# Patient Record
Sex: FEMALE | Race: WHITE | Employment: UNEMPLOYED | URBAN - METROPOLITAN AREA
[De-identification: names, ages, dates, MRNs, and addresses within clinical notes are randomized per-mention and may not be internally consistent; named-entity substitution may affect disease eponyms.]

---

## 2017-08-28 PROBLEM — I10 ESSENTIAL HYPERTENSION: Status: ACTIVE | Noted: 2017-08-28

## 2019-02-14 ENCOUNTER — HOSPITAL ENCOUNTER (OUTPATIENT)
Age: 59
Discharge: HOME | End: 2019-02-16
Payer: COMMERCIAL

## 2019-02-14 PROCEDURE — 88175 CYTOPATH C/V AUTO FLUID REDO: CPT

## 2019-03-25 ENCOUNTER — HOSPITAL ENCOUNTER (OUTPATIENT)
Age: 59
Discharge: HOME | End: 2019-03-27
Payer: COMMERCIAL

## 2019-03-25 PROCEDURE — 87186 SC STD MICRODIL/AGAR DIL: CPT

## 2019-03-25 PROCEDURE — 87088 URINE BACTERIA CULTURE: CPT

## 2019-03-28 LAB
ORGANISM: ABNORMAL
ORGANISM: ABNORMAL
URINE CULTURE, ROUTINE: ABNORMAL

## 2019-08-22 ENCOUNTER — HOSPITAL ENCOUNTER (OUTPATIENT)
Age: 59
Discharge: HOME | End: 2019-08-24
Payer: COMMERCIAL

## 2019-08-22 DIAGNOSIS — E34.9 HORMONE IMBALANCE: ICD-10-CM

## 2019-08-22 DIAGNOSIS — Z79.890 POSTMENOPAUSAL HRT (HORMONE REPLACEMENT THERAPY): ICD-10-CM

## 2019-08-22 LAB
ESTRADIOL LEVEL: 69.9 PG/ML
HBA1C MFR BLD: 4.9 % (ref 4–5.6)
T3 FREE: 2.7 PG/ML (ref 2–4.4)
T4 FREE: 1.24 NG/DL (ref 0.93–1.7)
TSH SERPL DL<=0.05 MIU/L-ACNC: 1.52 UIU/ML (ref 0.27–4.2)
VITAMIN D 25-HYDROXY: 47 NG/ML (ref 30–100)

## 2019-08-22 PROCEDURE — 84403 ASSAY OF TOTAL TESTOSTERONE: CPT

## 2019-08-22 PROCEDURE — 82306 VITAMIN D 25 HYDROXY: CPT

## 2019-08-22 PROCEDURE — 84144 ASSAY OF PROGESTERONE: CPT

## 2019-08-22 PROCEDURE — 84481 FREE ASSAY (FT-3): CPT

## 2019-08-22 PROCEDURE — 82627 DEHYDROEPIANDROSTERONE: CPT

## 2019-08-22 PROCEDURE — 83036 HEMOGLOBIN GLYCOSYLATED A1C: CPT

## 2019-08-22 PROCEDURE — 84439 ASSAY OF FREE THYROXINE: CPT

## 2019-08-22 PROCEDURE — 84443 ASSAY THYROID STIM HORMONE: CPT

## 2019-08-22 PROCEDURE — 82670 ASSAY OF TOTAL ESTRADIOL: CPT

## 2019-08-22 PROCEDURE — 84270 ASSAY OF SEX HORMONE GLOBUL: CPT

## 2019-08-24 LAB
DHEAS (DHEA SULFATE): 59 UG/DL (ref 26–200)
PROGESTERONE LEVEL: 1.16 NG/ML
SEX HORMONE BINDING GLOBULIN: 115 NMOL/L (ref 30–135)
TESTOSTERONE FREE-NONMALE: ABNORMAL PG/ML (ref 0.6–3.8)
TESTOSTERONE TOTAL: <3 NG/DL (ref 20–70)

## 2019-08-28 ENCOUNTER — APPOINTMENT (OUTPATIENT)
Dept: CT IMAGING | Age: 59
End: 2019-08-28
Payer: COMMERCIAL

## 2019-08-28 ENCOUNTER — APPOINTMENT (OUTPATIENT)
Dept: GENERAL RADIOLOGY | Age: 59
End: 2019-08-28
Payer: COMMERCIAL

## 2019-08-28 ENCOUNTER — HOSPITAL ENCOUNTER (OUTPATIENT)
Age: 59
Setting detail: OBSERVATION
Discharge: HOME | End: 2019-08-29
Attending: EMERGENCY MEDICINE | Admitting: INTERNAL MEDICINE
Payer: COMMERCIAL

## 2019-08-28 ENCOUNTER — APPOINTMENT (OUTPATIENT)
Dept: MRI IMAGING | Age: 59
End: 2019-08-28
Payer: COMMERCIAL

## 2019-08-28 DIAGNOSIS — R42 DIZZINESS: Primary | ICD-10-CM

## 2019-08-28 DIAGNOSIS — R00.1 BRADYCARDIA: ICD-10-CM

## 2019-08-28 PROBLEM — G45.9 TIA (TRANSIENT ISCHEMIC ATTACK): Status: ACTIVE | Noted: 2019-08-28

## 2019-08-28 LAB
ALBUMIN SERPL-MCNC: 4.3 G/DL (ref 3.5–5.2)
ALP BLD-CCNC: 54 U/L (ref 35–104)
ALT SERPL-CCNC: 16 U/L (ref 0–32)
ANION GAP SERPL CALCULATED.3IONS-SCNC: 7 MMOL/L (ref 7–16)
AST SERPL-CCNC: 18 U/L (ref 0–31)
BACTERIA: NORMAL /HPF
BASOPHILS ABSOLUTE: 0.05 E9/L (ref 0–0.2)
BASOPHILS RELATIVE PERCENT: 1 % (ref 0–2)
BILIRUB SERPL-MCNC: 0.5 MG/DL (ref 0–1.2)
BILIRUBIN URINE: NEGATIVE
BLOOD, URINE: NEGATIVE
BUN BLDV-MCNC: 9 MG/DL (ref 6–20)
CALCIUM SERPL-MCNC: 9.6 MG/DL (ref 8.6–10.2)
CHLORIDE BLD-SCNC: 100 MMOL/L (ref 98–107)
CLARITY: CLEAR
CO2: 32 MMOL/L (ref 22–29)
COLOR: YELLOW
CREAT SERPL-MCNC: 0.7 MG/DL (ref 0.5–1)
EKG ATRIAL RATE: 47 BPM
EKG P AXIS: 65 DEGREES
EKG P-R INTERVAL: 158 MS
EKG Q-T INTERVAL: 468 MS
EKG QRS DURATION: 88 MS
EKG QTC CALCULATION (BAZETT): 414 MS
EKG R AXIS: -36 DEGREES
EKG T AXIS: 56 DEGREES
EKG VENTRICULAR RATE: 47 BPM
EOSINOPHILS ABSOLUTE: 0.12 E9/L (ref 0.05–0.5)
EOSINOPHILS RELATIVE PERCENT: 2.3 % (ref 0–6)
GFR AFRICAN AMERICAN: >60
GFR AFRICAN AMERICAN: >60
GFR NON-AFRICAN AMERICAN: >60 ML/MIN/1.73
GFR NON-AFRICAN AMERICAN: >60 ML/MIN/1.73
GLUCOSE BLD-MCNC: 101 MG/DL (ref 74–99)
GLUCOSE BLD-MCNC: 101 MG/DL (ref 74–99)
GLUCOSE URINE: NEGATIVE MG/DL
HCT VFR BLD CALC: 42.3 % (ref 34–48)
HEMOGLOBIN: 13.9 G/DL (ref 11.5–15.5)
IMMATURE GRANULOCYTES #: 0.02 E9/L
IMMATURE GRANULOCYTES %: 0.4 % (ref 0–5)
KETONES, URINE: NEGATIVE MG/DL
LEUKOCYTE ESTERASE, URINE: ABNORMAL
LYMPHOCYTES ABSOLUTE: 1.44 E9/L (ref 1.5–4)
LYMPHOCYTES RELATIVE PERCENT: 27.9 % (ref 20–42)
MCH RBC QN AUTO: 31.5 PG (ref 26–35)
MCHC RBC AUTO-ENTMCNC: 32.9 % (ref 32–34.5)
MCV RBC AUTO: 95.9 FL (ref 80–99.9)
MONOCYTES ABSOLUTE: 0.58 E9/L (ref 0.1–0.95)
MONOCYTES RELATIVE PERCENT: 11.2 % (ref 2–12)
NEUTROPHILS ABSOLUTE: 2.96 E9/L (ref 1.8–7.3)
NEUTROPHILS RELATIVE PERCENT: 57.2 % (ref 43–80)
NITRITE, URINE: NEGATIVE
PDW BLD-RTO: 13 FL (ref 11.5–15)
PERFORMED ON: ABNORMAL
PH UA: 6.5 (ref 5–9)
PLATELET # BLD: 232 E9/L (ref 130–450)
PMV BLD AUTO: 11.6 FL (ref 7–12)
POC CHLORIDE: 103 MMOL/L (ref 100–108)
POC CREATININE: 0.6 MG/DL (ref 0.5–1)
POC POTASSIUM: 5.5 MMOL/L (ref 3.5–5)
POC SODIUM: 138 MMOL/L (ref 132–146)
POTASSIUM REFLEX MAGNESIUM: 4.1 MMOL/L (ref 3.5–5)
PROTEIN UA: NEGATIVE MG/DL
RBC # BLD: 4.41 E12/L (ref 3.5–5.5)
RBC UA: NORMAL /HPF (ref 0–2)
SODIUM BLD-SCNC: 139 MMOL/L (ref 132–146)
SPECIFIC GRAVITY UA: <=1.005 (ref 1–1.03)
TOTAL PROTEIN: 7.1 G/DL (ref 6.4–8.3)
TROPONIN: <0.01 NG/ML (ref 0–0.03)
TSH SERPL DL<=0.05 MIU/L-ACNC: 1.7 UIU/ML (ref 0.27–4.2)
UROBILINOGEN, URINE: 0.2 E.U./DL
WBC # BLD: 5.2 E9/L (ref 4.5–11.5)
WBC UA: NORMAL /HPF (ref 0–5)

## 2019-08-28 PROCEDURE — 80053 COMPREHEN METABOLIC PANEL: CPT

## 2019-08-28 PROCEDURE — 6360000004 HC RX CONTRAST MEDICATION: Performed by: RADIOLOGY

## 2019-08-28 PROCEDURE — 82565 ASSAY OF CREATININE: CPT

## 2019-08-28 PROCEDURE — 82435 ASSAY OF BLOOD CHLORIDE: CPT

## 2019-08-28 PROCEDURE — 93005 ELECTROCARDIOGRAM TRACING: CPT | Performed by: EMERGENCY MEDICINE

## 2019-08-28 PROCEDURE — 99285 EMERGENCY DEPT VISIT HI MDM: CPT

## 2019-08-28 PROCEDURE — 6370000000 HC RX 637 (ALT 250 FOR IP): Performed by: INTERNAL MEDICINE

## 2019-08-28 PROCEDURE — 81001 URINALYSIS AUTO W/SCOPE: CPT

## 2019-08-28 PROCEDURE — G0378 HOSPITAL OBSERVATION PER HR: HCPCS

## 2019-08-28 PROCEDURE — 99243 OFF/OP CNSLTJ NEW/EST LOW 30: CPT | Performed by: PSYCHIATRY & NEUROLOGY

## 2019-08-28 PROCEDURE — 93010 ELECTROCARDIOGRAM REPORT: CPT | Performed by: INTERNAL MEDICINE

## 2019-08-28 PROCEDURE — 70496 CT ANGIOGRAPHY HEAD: CPT

## 2019-08-28 PROCEDURE — A9579 GAD-BASE MR CONTRAST NOS,1ML: HCPCS | Performed by: RADIOLOGY

## 2019-08-28 PROCEDURE — 85025 COMPLETE CBC W/AUTO DIFF WBC: CPT

## 2019-08-28 PROCEDURE — 70553 MRI BRAIN STEM W/O & W/DYE: CPT

## 2019-08-28 PROCEDURE — 84132 ASSAY OF SERUM POTASSIUM: CPT

## 2019-08-28 PROCEDURE — 70498 CT ANGIOGRAPHY NECK: CPT

## 2019-08-28 PROCEDURE — 84443 ASSAY THYROID STIM HORMONE: CPT

## 2019-08-28 PROCEDURE — 6370000000 HC RX 637 (ALT 250 FOR IP): Performed by: EMERGENCY MEDICINE

## 2019-08-28 PROCEDURE — 71045 X-RAY EXAM CHEST 1 VIEW: CPT

## 2019-08-28 PROCEDURE — 70450 CT HEAD/BRAIN W/O DYE: CPT

## 2019-08-28 PROCEDURE — 84295 ASSAY OF SERUM SODIUM: CPT

## 2019-08-28 PROCEDURE — 36415 COLL VENOUS BLD VENIPUNCTURE: CPT

## 2019-08-28 PROCEDURE — 84484 ASSAY OF TROPONIN QUANT: CPT

## 2019-08-28 PROCEDURE — 82947 ASSAY GLUCOSE BLOOD QUANT: CPT

## 2019-08-28 PROCEDURE — 2580000003 HC RX 258: Performed by: INTERNAL MEDICINE

## 2019-08-28 RX ORDER — AMLODIPINE BESYLATE 5 MG/1
5 TABLET ORAL DAILY
Status: DISCONTINUED | OUTPATIENT
Start: 2019-08-29 | End: 2019-08-29 | Stop reason: HOSPADM

## 2019-08-28 RX ORDER — HYDROCHLOROTHIAZIDE 25 MG/1
25 TABLET ORAL DAILY
Status: DISCONTINUED | OUTPATIENT
Start: 2019-08-29 | End: 2019-08-29 | Stop reason: HOSPADM

## 2019-08-28 RX ORDER — ACETAMINOPHEN 325 MG/1
650 TABLET ORAL EVERY 4 HOURS PRN
Status: DISCONTINUED | OUTPATIENT
Start: 2019-08-28 | End: 2019-08-29 | Stop reason: HOSPADM

## 2019-08-28 RX ORDER — AMOXICILLIN 500 MG/1
500 CAPSULE ORAL 3 TIMES DAILY
Status: ON HOLD | COMMUNITY
Start: 2019-08-27 | End: 2019-08-29 | Stop reason: HOSPADM

## 2019-08-28 RX ORDER — SODIUM CHLORIDE 0.9 % (FLUSH) 0.9 %
10 SYRINGE (ML) INJECTION EVERY 12 HOURS SCHEDULED
Status: DISCONTINUED | OUTPATIENT
Start: 2019-08-28 | End: 2019-08-29 | Stop reason: HOSPADM

## 2019-08-28 RX ORDER — ESTRADIOL 0.05 MG/D
1 FILM, EXTENDED RELEASE TRANSDERMAL
COMMUNITY
End: 2020-02-27 | Stop reason: SDUPTHER

## 2019-08-28 RX ORDER — SODIUM CHLORIDE 0.9 % (FLUSH) 0.9 %
10 SYRINGE (ML) INJECTION
Status: ACTIVE | OUTPATIENT
Start: 2019-08-28 | End: 2019-08-28

## 2019-08-28 RX ORDER — M-VIT,TX,IRON,MINS/CALC/FOLIC 27MG-0.4MG
1 TABLET ORAL DAILY
Status: DISCONTINUED | OUTPATIENT
Start: 2019-08-29 | End: 2019-08-29 | Stop reason: HOSPADM

## 2019-08-28 RX ORDER — MECLIZINE HCL 12.5 MG/1
50 TABLET ORAL ONCE
Status: COMPLETED | OUTPATIENT
Start: 2019-08-28 | End: 2019-08-28

## 2019-08-28 RX ORDER — ONDANSETRON 2 MG/ML
4 INJECTION INTRAMUSCULAR; INTRAVENOUS EVERY 6 HOURS PRN
Status: DISCONTINUED | OUTPATIENT
Start: 2019-08-28 | End: 2019-08-29 | Stop reason: HOSPADM

## 2019-08-28 RX ORDER — SODIUM CHLORIDE 0.9 % (FLUSH) 0.9 %
10 SYRINGE (ML) INJECTION PRN
Status: DISCONTINUED | OUTPATIENT
Start: 2019-08-28 | End: 2019-08-29 | Stop reason: HOSPADM

## 2019-08-28 RX ORDER — ZOLPIDEM TARTRATE 5 MG/1
5 TABLET ORAL NIGHTLY PRN
Status: DISCONTINUED | OUTPATIENT
Start: 2019-08-28 | End: 2019-08-29 | Stop reason: HOSPADM

## 2019-08-28 RX ADMIN — ACETAMINOPHEN 650 MG: 325 TABLET, FILM COATED ORAL at 22:09

## 2019-08-28 RX ADMIN — MECLIZINE 50 MG: 12.5 TABLET ORAL at 12:03

## 2019-08-28 RX ADMIN — Medication 10 ML: at 16:56

## 2019-08-28 RX ADMIN — ZOLPIDEM TARTRATE 5 MG: 5 TABLET ORAL at 22:09

## 2019-08-28 RX ADMIN — GADOTERIDOL 10 ML: 279.3 INJECTION, SOLUTION INTRAVENOUS at 21:23

## 2019-08-28 RX ADMIN — IOPAMIDOL 60 ML: 755 INJECTION, SOLUTION INTRAVENOUS at 16:56

## 2019-08-28 RX ADMIN — Medication 10 ML: at 22:11

## 2019-08-28 ASSESSMENT — ENCOUNTER SYMPTOMS
NAUSEA: 0
EYE REDNESS: 0
SHORTNESS OF BREATH: 0
VOMITING: 0
ABDOMINAL PAIN: 0

## 2019-08-28 ASSESSMENT — PAIN SCALES - GENERAL
PAINLEVEL_OUTOF10: 0
PAINLEVEL_OUTOF10: 2
PAINLEVEL_OUTOF10: 0

## 2019-08-28 ASSESSMENT — PAIN DESCRIPTION - DESCRIPTORS: DESCRIPTORS: HEADACHE

## 2019-08-28 NOTE — CONSULTS
Tramaine Castillo is a 63 y.o. female       Chief Complaint   Patient presents with    Dizziness     Patient was pouring coffee and felt like \"the world tipped\" and dropped her coffee and  caught her, laid her down and patient states dizziness was worse when laying flat. HPI:  63year old woman with history of HTN presents with sudden onset dizziness. Occurred suddenly she felt as though the world was tilting initially. She denies jimmy vertigo. Never had any previous episodes in the past. She still feels intermittently dizzy especially when she sits up or moves quickly. No previous neurologic events in the past. No other associated symptoms. HPI  Prior to Visit Medications    Medication Sig Taking? Authorizing Provider   estradiol (VIVELLE) 0.05 MG/24HR Place 1 patch onto the skin Twice a Week Changes patch on Mondays and Thursdays Yes Historical Provider, MD   amoxicillin (AMOXIL) 500 MG capsule Take 500 mg by mouth 3 times daily After root canal procedure Yes Historical Provider, MD   progesterone (PROMETRIUM) 100 MG capsule TAKE 1 CAPSULE DAILY Yes Ingris Frias PA-C   amLODIPine (NORVASC) 5 MG tablet TAKE 1 TABLET DAILY (SCHEDULE APPOINTMENT WITH DOCTOR) Yes Andrea Butler MD   calcium carbonate (OSCAL) 500 MG TABS tablet Take 500 mg by mouth daily Yes Historical Provider, MD   hydrochlorothiazide (HYDRODIURIL) 25 MG tablet TAKE 1 TABLET DAILY Yes Maryann Patel MD   PREVIDENT 5000 SENSITIVE 1.1-5 % PSTE  Yes Historical Provider, MD   Multiple Vitamins-Minerals (THERAPEUTIC MULTIVITAMIN-MINERALS) tablet Take 1 tablet by mouth daily Yes Historical Provider, MD     Social History     Tobacco Use    Smoking status: Never Smoker    Smokeless tobacco: Never Used   Substance Use Topics    Alcohol use:  Yes     Alcohol/week: 7.0 standard drinks     Types: 7 Standard drinks or equivalent per week     Comment: daily glass of wine    Drug use: No     Family History   Problem Relation Age of Onset  Stroke Maternal Grandmother     Heart Disease Father     Heart Attack Mother     Stroke Mother     Hypertension Mother     Diabetes Brother      Past Surgical History:   Procedure Laterality Date    DILATION AND CURETTAGE       Past Medical History:   Diagnosis Date    Hypertension      Review of Systems   Neurological: Positive for dizziness. All other systems reviewed and are negative. Objective:   BP (!) 140/73   Pulse (!) 48   Temp 97.8 °F (36.6 °C) (Temporal)   Resp 16   Ht 5' (1.524 m)   Wt 110 lb (49.9 kg)   LMP  (LMP Unknown)   SpO2 96%   BMI 21.48 kg/m²     Physical Exam   Constitutional: She appears well-nourished. No distress. HENT:   Head: Normocephalic and atraumatic. Mouth/Throat: Oropharynx is clear and moist.   Eyes: Pupils are equal, round, and reactive to light. Conjunctivae are normal.   Neck: Neck supple. Cardiovascular: Normal rate and regular rhythm. Pulmonary/Chest: Effort normal.   Musculoskeletal: Normal range of motion. Neurological: She has normal strength and normal reflexes. Skin: Skin is warm and dry. Psychiatric: Her speech is normal.               Neurological Exam  Mental Status   Oriented to person, place, time and situation. Recent and remote memory are intact. Speech is normal. Language is fluent with no aphasia. Attention and concentration are normal. Fund of knowledge is appropriate for level of education. Cranial Nerves  CN II: Visual fields full to confrontation. CN III, IV, VI: Pupils equal round and reactive to light bilaterally. Right beating nystagmus worse when looking to the right. Impaired smooth pursuit worse when moving eyes to the left. .  CN V: Facial sensation is normal.  CN VII: Full and symmetric facial movement. CN VIII: Hearing is normal.  CN IX, X: Palate elevates symmetrically  CN XI: Shoulder shrug strength is normal.  CN XII: Tongue midline without atrophy or fasciculations.     Motor  Normal muscle bulk

## 2019-08-28 NOTE — PROGRESS NOTES
Spiketeddy Leyda was ordered progesterone which is a nonformulary medication. The patient has indicated that the home supply of this medication will be brought in to the hospital for inpatient use. If the medication has not been administered by 1400 on the following day from the time the order was placed, a pharmacist will follow-up with the nurse of the patient to assess the capability of the patient to bring in the medication. If it is determined that the patient cannot supply the medication and it is not available to be dispensed from the pharmacy, a call will be placed to the ordering provider to discuss alternative options.       Leana Ohara, PharmD 8/28/2019 3:10 PM

## 2019-08-28 NOTE — ED PROVIDER NOTES
4.200 uIU/mL   Urinalysis, reflex to microscopic   Result Value Ref Range    Color, UA Yellow Straw/Yellow    Clarity, UA Clear Clear    Glucose, Ur Negative Negative mg/dL    Bilirubin Urine Negative Negative    Ketones, Urine Negative Negative mg/dL    Specific Gravity, UA <=1.005 1.005 - 1.030    Blood, Urine Negative Negative    pH, UA 6.5 5.0 - 9.0    Protein, UA Negative Negative mg/dL    Urobilinogen, Urine 0.2 <2.0 E.U./dL    Nitrite, Urine Negative Negative    Leukocyte Esterase, Urine TRACE (A) Negative   Microscopic Urinalysis   Result Value Ref Range    WBC, UA 1-3 0 - 5 /HPF    RBC, UA NONE 0 - 2 /HPF    Bacteria, UA NONE /HPF   POCT Venous   Result Value Ref Range    POC Sodium 138 132 - 146 mmol/L    POC Potassium 5.5 (H) 3.5 - 5.0 mmol/L    POC Chloride 103 100 - 108 mmol/L    POC Glucose 101 (H) 74 - 99 mg/dl    POC Creatinine 0.6 0.5 - 1.0 mg/dL    GFR Non-African American >60 >=60 mL/min/1.73    GFR  >60     Performed on SEE BELOW    EKG 12 Lead   Result Value Ref Range    Ventricular Rate 47 BPM    Atrial Rate 47 BPM    P-R Interval 158 ms    QRS Duration 88 ms    Q-T Interval 468 ms    QTc Calculation (Bazett) 414 ms    P Axis 65 degrees    R Axis -36 degrees    T Axis 56 degrees       RADIOLOGY:  Interpreted by Radiologist.  CT Head WO Contrast   Final Result      1. No evidence of intracranial hemorrhage or edema. 2. No evidence of arterial stenosis at level of the [place]. 3. No evidence of stenosis involving proximal or distal cervical   internal carotid arteries or vertebral arteries. CTA HEAD W CONTRAST   Final Result      1. No evidence of intracranial hemorrhage or edema. 2. No evidence of arterial stenosis at level of the [place]. 3. No evidence of stenosis involving proximal or distal cervical   internal carotid arteries or vertebral arteries. CTA NECK W CONTRAST   Final Result      1.  No evidence of

## 2019-08-29 VITALS
DIASTOLIC BLOOD PRESSURE: 54 MMHG | HEIGHT: 60 IN | OXYGEN SATURATION: 98 % | SYSTOLIC BLOOD PRESSURE: 114 MMHG | TEMPERATURE: 98.7 F | HEART RATE: 50 BPM | BODY MASS INDEX: 21.6 KG/M2 | RESPIRATION RATE: 16 BRPM | WEIGHT: 110 LBS

## 2019-08-29 PROCEDURE — G0378 HOSPITAL OBSERVATION PER HR: HCPCS

## 2019-08-29 RX ORDER — MECLIZINE HCL 12.5 MG/1
12.5 TABLET ORAL 3 TIMES DAILY PRN
Qty: 15 TABLET | Refills: 0 | Status: SHIPPED | OUTPATIENT
Start: 2019-08-29 | End: 2019-09-08

## 2019-08-29 NOTE — PLAN OF CARE
Problem: HEMODYNAMIC STATUS  Goal: Patient has stable vital signs and fluid balance  Outcome: Met This Shift     Problem: ACTIVITY INTOLERANCE/IMPAIRED MOBILITY  Goal: Mobility/activity is maintained at optimum level for patient  Outcome: Met This Shift     Problem: COMMUNICATION IMPAIRMENT  Goal: Ability to express needs and understand communication  Outcome: Met This Shift

## 2019-08-29 NOTE — PROGRESS NOTES
CLINICAL PHARMACY NOTE: MEDS TO [address] [place] [address] Drive Select Patient?: No  Total # of Prescriptions Filled: 1   The following medications were delivered to the patient:  · Meclizine 12.5 mg  Total # of Interventions Completed: 3  Time Spent (min): 15    Additional Documentation:

## 2019-08-31 NOTE — DISCHARGE SUMMARY
PERRL, conjunctiva/corneas clear, EOM's intact, fundi     benign, both eyes   Ears:    Normal TM's and external ear canals, both ears   Nose:   Nares normal, septum midline, mucosa normal, no drainage    or sinus tenderness   Throat:   Lips, mucosa, and tongue normal; teeth and gums normal   Neck:   Supple, symmetrical, trachea midline, no adenopathy;     thyroid:  no enlargement/tenderness/nodules; no carotid    bruit or JVD   Back:     Symmetric, no curvature, ROM normal, no CVA tenderness   Lungs:     Clear to auscultation bilaterally, respirations unlabored   Chest Wall:    No tenderness or deformity    Heart:    Regular rate and rhythm, S1 and S2 normal, no murmur, rub   or gallop   Breast Exam:    No tenderness, masses, or nipple abnormality   Abdomen:     Soft, non-tender, bowel sounds active all four quadrants,     no masses, no organomegaly   Genitalia:    Normal female without lesion, discharge or tenderness   Rectal:    Normal tone ;guaiac negative stool   Extremities:   Extremities normal, atraumatic, no cyanosis or edema   Pulses:   2+ and symmetric all extremities   Skin:   Skin color, texture, turgor normal, no rashes or lesions   Lymph nodes:   Cervical, supraclavicular, and axillary nodes normal   Neurologic:   CNII-XII intact, normal strength, sensation and reflexes     throughout       Disposition: home    Patient Instructions:      Medication List      START taking these medications    meclizine 12.5 MG tablet  Commonly known as:  ANTIVERT  Take 1 tablet by mouth 3 times daily as needed for Dizziness        CONTINUE taking these medications    amLODIPine 5 MG tablet  Commonly known as:  NORVASC  TAKE 1 TABLET DAILY (SCHEDULE APPOINTMENT WITH DOCTOR)     calcium carbonate 500 MG Tabs tablet  Commonly known as:  OSCAL     estradiol 0.05 MG/24HR  Commonly known as:  MANNYELLE     hydrochlorothiazide 25 MG tablet  Commonly known as:  HYDRODIURIL  TAKE 1 TABLET DAILY     PREVIDENT 5000 SENSITIVE 1.1-5 % Pste  Generic drug:  Sod Fluoride-Potassium Nitrate     progesterone 100 MG capsule  Commonly known as:  PROMETRIUM  TAKE 1 CAPSULE DAILY     therapeutic multivitamin-minerals tablet        STOP taking these medications    amoxicillin 500 MG capsule  Commonly known as:  AMOXIL           Where to Get Your Medications      These medications were sent to [facility] [address] [address]    Phone:  [phone]   · meclizine 12.5 MG tablet        Activity: activity as tolerated  Diet: {diet: No diet orders on file  Wound Care: none needed    Follow-up with DR in 2 weeks.   Greater than 35 minutes spent on discharge preparations, examining patient, discussing with patient and coordinating with nurses and staff  Garcia exercises at home BID     Short course of PRN Antivert  Signed:  Ute Campbell  8/31/2019  4:57 AM

## 2019-09-09 PROBLEM — R42 VERTIGO: Status: ACTIVE | Noted: 2019-09-09

## 2019-09-12 ENCOUNTER — OFFICE VISIT (OUTPATIENT)
Dept: NEUROLOGY | Age: 59
End: 2019-09-12
Payer: COMMERCIAL

## 2019-09-12 VITALS
HEART RATE: 60 BPM | RESPIRATION RATE: 12 BRPM | BODY MASS INDEX: 21.17 KG/M2 | HEIGHT: 59 IN | WEIGHT: 105 LBS | TEMPERATURE: 98.2 F | DIASTOLIC BLOOD PRESSURE: 76 MMHG | SYSTOLIC BLOOD PRESSURE: 138 MMHG

## 2019-09-12 DIAGNOSIS — R90.82 WHITE MATTER ABNORMALITY ON MRI OF BRAIN: ICD-10-CM

## 2019-09-12 DIAGNOSIS — R42 VERTIGO: Primary | ICD-10-CM

## 2019-09-12 PROBLEM — H81.10 BPV (BENIGN POSITIONAL VERTIGO): Status: ACTIVE | Noted: 2019-08-28

## 2019-09-12 PROCEDURE — 1036F TOBACCO NON-USER: CPT | Performed by: NURSE PRACTITIONER

## 2019-09-12 PROCEDURE — 3017F COLORECTAL CA SCREEN DOC REV: CPT | Performed by: NURSE PRACTITIONER

## 2019-09-12 PROCEDURE — 99214 OFFICE O/P EST MOD 30 MIN: CPT | Performed by: NURSE PRACTITIONER

## 2019-09-12 PROCEDURE — G8427 DOCREV CUR MEDS BY ELIG CLIN: HCPCS | Performed by: NURSE PRACTITIONER

## 2019-09-12 PROCEDURE — G8420 CALC BMI NORM PARAMETERS: HCPCS | Performed by: NURSE PRACTITIONER

## 2019-09-12 SDOH — HEALTH STABILITY: MENTAL HEALTH: HOW OFTEN DO YOU HAVE A DRINK CONTAINING ALCOHOL?: 4 OR MORE TIMES A WEEK

## 2019-09-12 NOTE — PROGRESS NOTES
[address] [facility]. Noel Vora M.D., F.A.C.P. Patrica Méndez, HENRI, APRN, CNS  Dale Headley. Lane Lima, MSN, APRN-FNP-C  Odell Hayes MSN, APRN, FNP-C  JADA Victoria Cancer MSN, APRN, FNP-C  [address], [facility]  [place], [address] [address]  Phone: [phone]  Fax: [phone]       Edin Anguiano is a 63 y.o. right handed female     We are seeing her as a hosptial follow up for BPPV    She presents alone and is an excellent historian    PMH: HTN    She was seen by Dr. Kim Nunez last month after the sudden onset of dizziness described as the world tilting. Epley maneuver was done which proved nystagmus and recurrence of symptoms with L head turning. MRI of the brain proved a moderate amount of scattered white matter changes---mostly juxtacortical with a few periventricular lesions. She was discharged on a short course of Antivert, which she has only used once or twice as it makes her tired. Her symptoms are not resolved, but have improved. She has not been doing her head exercises at home. When she sits in a certain position, like leaning back in the dentist chair earlier today, her vertigo returns. No nausea or vomiting. No spinning sensations with head movements. No new weakness, clumsiness, gait issues, diplopia, paresthesias, or falls. No other new medical issues.     No chest pain or palpitations  No SOB  No falls, tripping or stumbling  No incontinence of bowels or bladder  No itching or bruising appreciated  No numbness, tingling or focal arm/leg weakness    ROS otherwise negative     Current Outpatient Medications   Medication Sig Dispense Refill    MECLIZINE HCL PO Take by mouth as needed      estradiol (VIVELLE) 0.05 MG/24HR Place 1 patch onto the skin Twice a Week Changes patch on Mondays and Thursdays      progesterone (PROMETRIUM) 100 MG capsule TAKE 1 CAPSULE DAILY 90 capsule 2    amLODIPine (NORVASC) 5 MG tablet TAKE 1 TABLET DAILY (SCHEDULE APPOINTMENT WITH DOCTOR) 90 tablet 1    calcium carbonate (OSCAL) 500 MG TABS tablet Take 500 mg by mouth daily      hydrochlorothiazide (HYDRODIURIL) 25 MG tablet TAKE 1 TABLET DAILY 90 tablet 3    PREVIDENT 5000 SENSITIVE 1.1-5 % PSTE       Multiple Vitamins-Minerals (THERAPEUTIC MULTIVITAMIN-MINERALS) tablet Take 1 tablet by mouth daily       No current facility-administered medications for this visit. Objective:     /76 (Site: Right Upper Arm, Position: Sitting, Cuff Size: Medium Adult)   Pulse 60   Temp 98.2 °F (36.8 °C) (Oral)   Resp 12   Ht 4' 11\" (1.499 m)   Wt 105 lb (47.6 kg)   LMP  (LMP Unknown)   BMI 21.21 kg/m²      General appearance: alert, appears stated age and cooperative--appears tired  Head: Normocephalic, without obvious abnormality, atraumatic  Eyes: conjunctivae/corneas clear.    Neck: no adenopathy, no carotid bruit, no JVD, supple, symmetrical, trachea midline and thyroid not enlarged, symmetric, no tenderness/mass/nodules  Lungs: clear to auscultation bilaterally  Heart: regular rate and rhythm, S1, S2 normal, no murmur, click, rub or gallop  Extremities: extremities normal, atraumatic, no cyanosis or edema  Pulses: 2+ and symmetric  Skin: Skin color, texture, turgor normal. No rashes or lesions       Mental Status: Alert, oriented, thought content appropriate    Appropriate attention/concentration  Intact fundus of knowledge  Intact memories    Speech: no dysarthria  Language: no aphasias    Cranial Nerves:  I: smell NA   II: visual acuity  NA   II: visual fields Full to confrontation   II: pupils HAYDER   III,VII: ptosis None   III,IV,VI: extraocular muscles  Full ROM   V: mastication Normal   V: facial light touch sensation  Normal   V,VII: corneal reflex     VII: facial muscle function - upper  Normal   VII: facial muscle function - lower Normal   VIII: hearing Normal   IX: soft palate elevation  Normal   IX,X: gag reflex    XI: trapezius strength  5/5   XI:

## 2019-09-12 NOTE — PATIENT INSTRUCTIONS
Patient Education        Benign Paroxysmal Positional Vertigo (BPPV): Care Instructions  Your Care Instructions    Benign paroxysmal positional vertigo, also called BPPV, is an inner ear problem. It causes a spinning or whirling sensation when you move your head. This sensation is called vertigo. The vertigo usually lasts for less than a minute. People often have vertigo spells for a few days or weeks. Then the vertigo goes away. But it may come back again. The vertigo may be mild, or it may be bad enough to cause unsteadiness, nausea, and vomiting. When you move, your inner ear sends messages to the brain. This helps you keep your balance. Vertigo can happen when debris builds up in the inner ear. The buildup can cause the inner ear to send the wrong message to the brain. Your doctor may move you in different positions to help your vertigo get better faster. This is called the Epley maneuver. Your doctor may also prescribe medicines or exercises to help with your symptoms. Follow-up care is a key part of your treatment and safety. Be sure to make and go to all appointments, and call your doctor if you are having problems. It's also a good idea to know your test results and keep a list of the medicines you take. How can you care for yourself at home? · If your doctor suggests that you do [place]am[place]-Daroff exercises:  ? Sit on the edge of a bed or sofa. Quickly lie down on the side that causes the worst vertigo. Lie on your side with your ear down. ? Stay in this position for at least 30 seconds or until the vertigo goes away. ? Sit up. If this causes vertigo, wait for it to stop. ? Repeat the procedure on the other side. ? Repeat this 10 times. Do these exercises 2 times a day until the vertigo is gone. When should you call for help? Call 911 anytime you think you may need emergency care. For example, call if:    · You have symptoms of a stroke.  These may include:  ? Sudden numbness, tingling, weakness, or loss of movement in your face, arm, or leg, especially on only one side of your body. ? Sudden vision changes. ? Sudden trouble speaking. ? Sudden confusion or trouble understanding simple statements. ? Sudden problems with walking or balance. ? A sudden, severe headache that is different from past headaches.    Call your doctor now or seek immediate medical care if:    · You have new or worse nausea and vomiting.     · You have new symptoms such as hearing loss or roaring in your ears.    Watch closely for changes in your health, and be sure to contact your doctor if:    · You are not getting better as expected.     · Your vertigo gets worse. Where can you learn more? Go to [email or web address]://[email or web address].[employer]. org and sign in to your [facility] account. Enter  in the [employer] box to learn more about \"Benign Paroxysmal Positional Vertigo (BPPV): Care Instructions. \"     If you do not have an account, please click on the \"Sign Up Now\" link. Current as of: October 21, 2018  Content Version: 12.1  © 3293-4408 Healthwise, Incorporated. Care instructions adapted under license by [facility] ([facility]). If you have questions about a medical condition or this instruction, always ask your healthcare professional. [place] [address] any warranty or liability for your use of this information.

## 2019-09-13 PROBLEM — R90.82 WHITE MATTER ABNORMALITY ON MRI OF BRAIN: Status: ACTIVE | Noted: 2019-09-13

## 2020-02-21 ENCOUNTER — TELEPHONE (OUTPATIENT)
Dept: NEUROLOGY | Age: 60
End: 2020-02-21

## 2020-02-21 NOTE — TELEPHONE ENCOUNTER
JERI CAMERON for pt to call office and schedule follow up with MEG Matthews, as schedule is now available.   Electronically signed by Gloria Mane on 2/21/20 at 4:19 PM

## 2020-03-02 ENCOUNTER — HOSPITAL ENCOUNTER (OUTPATIENT)
Age: 60
Discharge: HOME | End: 2020-03-04
Payer: COMMERCIAL

## 2020-03-02 PROCEDURE — 88175 CYTOPATH C/V AUTO FLUID REDO: CPT

## 2020-03-04 ENCOUNTER — TELEPHONE (OUTPATIENT)
Dept: NEUROLOGY | Age: 60
End: 2020-03-04

## 2020-03-04 NOTE — TELEPHONE ENCOUNTER
JERI CAMERON for pt to call office and schedule follow up with MEG Hampton, as schedule is now available.   Electronically signed by Sri Hughes on 3/4/20 at 4:04 PM

## 2020-03-20 ENCOUNTER — TELEPHONE (OUTPATIENT)
Dept: NEUROLOGY | Age: 60
End: 2020-03-20

## 2020-03-20 NOTE — LETTER
[facility]  [address] [facility], [address] [place] [address] Drive  [address] [place] [address]  Phone: [phone]  Fax: [phone]    MEG Khan - CNP        March 20, 2020    [place] [address]      Dear Isabella Baron: We have been unable to reach you to schedule your follow up appointment with our office. Please contact us as soon as possible to avoid any potential delays in your care.     Sincerely,      [facility] ([facility]) Neurology Staff

## 2020-03-25 ENCOUNTER — TELEPHONE (OUTPATIENT)
Dept: ADMINISTRATIVE | Age: 60
End: 2020-03-25

## 2020-03-25 NOTE — TELEPHONE ENCOUNTER
Pt's  called, appreciated the call to reach out, she is perfect, asymptomatic, no need to schedule a follow up. Thank you.

## 2020-03-25 NOTE — TELEPHONE ENCOUNTER
Forwarding to MEG Olguin, for informational purposes.   Electronically signed by Mirza Mcbride on 3/25/20 at 1:32 PM EDT

## 2020-12-09 DIAGNOSIS — N95.0 POST-MENOPAUSAL BLEEDING: ICD-10-CM

## 2020-12-09 LAB
ESTRADIOL LEVEL: 101.9 PG/ML
HCT VFR BLD CALC: 45.3 % (ref 34–48)
HEMOGLOBIN: 15.2 G/DL (ref 11.5–15.5)
MCH RBC QN AUTO: 31.7 PG (ref 26–35)
MCHC RBC AUTO-ENTMCNC: 33.6 % (ref 32–34.5)
MCV RBC AUTO: 94.6 FL (ref 80–99.9)
PDW BLD-RTO: 12.4 FL (ref 11.5–15)
PLATELET # BLD: 239 E9/L (ref 130–450)
PMV BLD AUTO: 11.8 FL (ref 7–12)
PROLACTIN: 8.82 NG/ML
RBC # BLD: 4.79 E12/L (ref 3.5–5.5)
TSH SERPL DL<=0.05 MIU/L-ACNC: 1.86 UIU/ML (ref 0.27–4.2)
WBC # BLD: 6.2 E9/L (ref 4.5–11.5)

## 2020-12-10 LAB — T4 FREE: 1.16 NG/DL (ref 0.93–1.7)

## 2020-12-11 LAB
PROGESTERONE LEVEL: 0.92 NG/ML
T3 FREE: 2.6 PG/ML (ref 2–4.4)

## 2021-01-10 DIAGNOSIS — Z20.822 CONTACT WITH AND (SUSPECTED) EXPOSURE TO COVID-19: Primary | ICD-10-CM

## 2021-02-09 ENCOUNTER — HOSPITAL ENCOUNTER (OUTPATIENT)
Age: 61
Discharge: HOME | End: 2021-02-11

## 2021-02-09 PROCEDURE — 88305 TISSUE EXAM BY PATHOLOGIST: CPT

## 2021-04-08 DIAGNOSIS — E55.9 VITAMIN D DEFICIENCY: ICD-10-CM

## 2021-04-08 DIAGNOSIS — Z79.890 POSTMENOPAUSAL HRT (HORMONE REPLACEMENT THERAPY): ICD-10-CM

## 2021-04-08 DIAGNOSIS — E34.9 HORMONE IMBALANCE: ICD-10-CM

## 2021-04-09 LAB
CORTISOL TOTAL: 10.34 MCG/DL (ref 2.68–18.4)
ESTRADIOL LEVEL: 50.8 PG/ML
T3 FREE: 2.7 PG/ML (ref 2–4.4)
T4 FREE: 1.19 NG/DL (ref 0.93–1.7)
TSH SERPL DL<=0.05 MIU/L-ACNC: 1.47 UIU/ML (ref 0.27–4.2)
VITAMIN D 25-HYDROXY: 51 NG/ML (ref 30–100)

## 2021-04-10 LAB
DHEAS (DHEA SULFATE): 75 UG/DL (ref 26–200)
PROGESTERONE LEVEL: 2.28 NG/ML
SEX HORMONE BINDING GLOBULIN: 88 NMOL/L (ref 30–135)
TESTOSTERONE FREE-NONMALE: ABNORMAL PG/ML (ref 0.6–3.8)
TESTOSTERONE TOTAL: <3 NG/DL (ref 20–70)

## 2021-04-14 LAB — PREGNENOLONE: NORMAL NG/DL (ref 15–132)

## 2021-12-07 ENCOUNTER — TELEPHONE (OUTPATIENT)
Dept: SURGERY | Age: 61
End: 2021-12-07

## 2021-12-07 NOTE — TELEPHONE ENCOUNTER
Scheduled pt for Colonoscopy on 1/5/21 (Wednesday) at 11:00 am with Dr. Bonnie Malik. Pt needs to arrive at [address] at 10:00 am. Brian Witt pt directions and instruction sheet via mail Pt accepted date/time and verbalized understanding. Pt needs prep sent to pharmacy (updated in chart)   Pt does not want Golytely prep. MA routing to Dr. Bonnie Malik for prep.     Electronically signed by Blaire Dean on 12/7/21 at 10:28 AM EST

## 2021-12-08 ENCOUNTER — IMMUNIZATION (OUTPATIENT)
Dept: PRIMARY CARE CLINIC | Age: 61
End: 2021-12-08
Payer: COMMERCIAL

## 2021-12-08 PROCEDURE — 0064A COVID-19, MODERNA BOOSTER VACCINE 0.25ML DOSE: CPT | Performed by: INTERNAL MEDICINE

## 2021-12-08 PROCEDURE — 91306 COVID-19, MODERNA BOOSTER VACCINE 0.25ML DOSE: CPT | Performed by: INTERNAL MEDICINE

## 2021-12-08 RX ORDER — SODIUM, POTASSIUM,MAG SULFATES 17.5-3.13G
1 SOLUTION, RECONSTITUTED, ORAL ORAL ONCE
Qty: 1 EACH | Refills: 0 | Status: SHIPPED | OUTPATIENT
Start: 2021-12-08 | End: 2021-12-08

## 2021-12-08 NOTE — TELEPHONE ENCOUNTER
Orders Placed This Encounter   Medications    Na Sulfate-K Sulfate-Mg Sulf (SUPREP BOWEL PREP KIT) 17.5-3.13-1.6 GM/177ML SOLN     Sig: Take 1 kit by mouth once for 1 dose     Dispense:  1 each     Refill:  0

## 2021-12-21 ENCOUNTER — TELEPHONE (OUTPATIENT)
Dept: SURGERY | Age: 61
End: 2021-12-21

## 2021-12-21 NOTE — TELEPHONE ENCOUNTER
Prior Authorization Form:      DEMOGRAPHICS:                     Patient Name:  Salvatore Coffey  Patient :  1960            Insurance:  Payor: [employer] / Plan: [employer] [address] / Product Type: *No Product type* /   Insurance ID Number:    Payor/Plan Subscr  Sex Relation Sub. Ins. ID Effective Group Num   1.  1041 Edmar Carroll 1960 Male Spouse [id number] 1/1/15 [id number]                                   [address]         DIAGNOSIS & PROCEDURE:                       Procedure/Operation: COLONOSCOPY           CPT Code: 22702    Diagnosis:  SCREENING FOR COLON CANCER    ICD10 Code: Z12.11    Location:  [facility]    Surgeon:  DR. Jan Barriag    SCHEDULING INFORMATION:                          Date: 22    Time: 11:00 AM              Anesthesia: [facility]                                                       Status:  Outpatient        Special Comments:  N/A       Electronically signed by Jennifer Mack on 2021 at 3:14 PM

## 2021-12-28 NOTE — PROGRESS NOTES
Patient states she has not received the bowel prep instructions for colonoscopy on 1/5/22. Advised for patient to call Dr. Haley Engle office. Spoke with Kizzy Horn at Dr. Haley Engle office regarding above.

## 2021-12-28 NOTE — PROGRESS NOTES
[address] PRE-ADMISSION TESTING ENDOSCOPY INSTRUCTIONS- [facility]-phone number:[phone]    ENDOSCOPY INSTRUCTIONS:   [x] Bowel prep instructions reviewed. [x] Nothing by mouth after midnight, including gum, candy, mints, or water. Please follow your surgeons instructions if you are required to complete a bowel prep. Colonoscopy- no solid food-only clear liquids the day prior). [x] You may brush your teeth, gargle, but do NOT swallow water. [x] Do not wear makeup, lotions, powders, deodorant. Nail polish as directed by the nurse. [x] Arrange transportation with a responsible adult  to and from the hospital. If you do not have a responsible adult  to transport you, you will need to make arrangements with a medical transportation company (i.e. [employer]. A Uber/taxi/bus is not appropriate unless you are accompanied by a responsible adult ). Arrange for someone to be with you for the remainder of the day and for 24 hours after your procedure due to having had anesthesia. Who will be your  for transportation?_____Mark_______   Who will be staying with you for 24 hrs after your procedure?__________________    PARKING INSTRUCTIONS:   [x] Arrival Time:___1000_________  · [x] Parking lot  \"I\" OR 1 is located on [facility] (the corner of [facility]). To enter, press the button and the gate will lift. A free token will be provided to exit the lot. One car per patient is allowed to park in this lot. All other cars are to park on [address] [place] [address] either in the parking garage or the handicap lot. [] To reach the [facility] lobby from [address] [place] [address], upon entering the hospital, take elevator B to the 3rd floor. EDUCATION INSTRUCTIONS:  [x] Bring a complete list of your medications, please write the last time you took the medicine, give this list to the nurse.   [x] Take the following medications the morning of surgery with 1-2 ounces of water:  Amlodipine   [x] Stop herbal supplements and vitamins 5 days before your surgery. [] DO NOT take any diabetic medicine the morning of surgery. Follow instructions for insulin the day before surgery. [] If you are diabetic and your blood sugar is low or you feel symptomatic, you may drink 1-2 ounces of apple juice or take a glucose tablet. The morning of your procedure, you may call the pre-op area if you have concerns about your blood sugar [phone]. [x] Use your inhalers the morning of surgery. Bring your emergency inhaler with you day of surgery. [x] Follow physician instructions regarding any blood thinners you may be taking. WHAT TO EXPECT:  [x] The day of your procedure you will be greeted and checked in by the [employer].  In addition, you will be registered in the [employer] by a Patient Access Representative. Please bring your photo ID and insurance card. A nurse will greet you in accordance to the time you are needed in the pre-op area to prepare you for surgery. Please do not be discouraged if you are not greeted in the order you arrive as there are many variables that are involved in patient preparation. Your patience is greatly appreciated as you wait for your nurse. Please bring in items such as: books, magazines, newspapers, electronics, or any other items  to occupy your time in the waiting area. [x]  Delays may occur. Staff will make a sincere effort to keep you informed of delays. If any delays occur with your procedure, we apologize ahead of time for your inconvenience as we recognize the value of your time.

## 2022-01-05 ENCOUNTER — ANESTHESIA (OUTPATIENT)
Dept: ENDOSCOPY | Age: 62
End: 2022-01-05
Payer: COMMERCIAL

## 2022-01-05 ENCOUNTER — ANESTHESIA EVENT (OUTPATIENT)
Dept: ENDOSCOPY | Age: 62
End: 2022-01-05
Payer: COMMERCIAL

## 2022-01-05 ENCOUNTER — HOSPITAL ENCOUNTER (OUTPATIENT)
Age: 62
Setting detail: OUTPATIENT SURGERY
Discharge: HOME | End: 2022-01-05
Attending: SURGERY | Admitting: SURGERY
Payer: COMMERCIAL

## 2022-01-05 VITALS
TEMPERATURE: 97.1 F | BODY MASS INDEX: 23.18 KG/M2 | WEIGHT: 115 LBS | DIASTOLIC BLOOD PRESSURE: 54 MMHG | OXYGEN SATURATION: 98 % | RESPIRATION RATE: 17 BRPM | HEART RATE: 60 BPM | SYSTOLIC BLOOD PRESSURE: 121 MMHG | HEIGHT: 59 IN

## 2022-01-05 VITALS — DIASTOLIC BLOOD PRESSURE: 70 MMHG | OXYGEN SATURATION: 99 % | SYSTOLIC BLOOD PRESSURE: 131 MMHG

## 2022-01-05 PROBLEM — Z12.11 COLON CANCER SCREENING: Status: ACTIVE | Noted: 2022-01-05

## 2022-01-05 PROCEDURE — 45378 DIAGNOSTIC COLONOSCOPY: CPT | Performed by: SURGERY

## 2022-01-05 PROCEDURE — 3700000000 HC ANESTHESIA ATTENDED CARE: Performed by: SURGERY

## 2022-01-05 PROCEDURE — 7100000011 HC PHASE II RECOVERY - ADDTL 15 MIN: Performed by: SURGERY

## 2022-01-05 PROCEDURE — 6360000002 HC RX W HCPCS: Performed by: NURSE ANESTHETIST, CERTIFIED REGISTERED

## 2022-01-05 PROCEDURE — 2580000003 HC RX 258: Performed by: SURGERY

## 2022-01-05 PROCEDURE — 3700000001 HC ADD 15 MINUTES (ANESTHESIA): Performed by: SURGERY

## 2022-01-05 PROCEDURE — 2709999900 HC NON-CHARGEABLE SUPPLY: Performed by: SURGERY

## 2022-01-05 PROCEDURE — 3609027000 HC COLONOSCOPY: Performed by: SURGERY

## 2022-01-05 PROCEDURE — 7100000010 HC PHASE II RECOVERY - FIRST 15 MIN: Performed by: SURGERY

## 2022-01-05 RX ORDER — PROPOFOL 10 MG/ML
INJECTION, EMULSION INTRAVENOUS PRN
Status: DISCONTINUED | OUTPATIENT
Start: 2022-01-05 | End: 2022-01-05 | Stop reason: SDUPTHER

## 2022-01-05 RX ORDER — SODIUM CHLORIDE 9 MG/ML
25 INJECTION, SOLUTION INTRAVENOUS PRN
Status: DISCONTINUED | OUTPATIENT
Start: 2022-01-05 | End: 2022-01-05 | Stop reason: HOSPADM

## 2022-01-05 RX ORDER — SODIUM CHLORIDE 0.9 % (FLUSH) 0.9 %
10 SYRINGE (ML) INJECTION PRN
Status: DISCONTINUED | OUTPATIENT
Start: 2022-01-05 | End: 2022-01-05 | Stop reason: HOSPADM

## 2022-01-05 RX ORDER — SODIUM CHLORIDE 0.9 % (FLUSH) 0.9 %
10 SYRINGE (ML) INJECTION EVERY 12 HOURS SCHEDULED
Status: DISCONTINUED | OUTPATIENT
Start: 2022-01-05 | End: 2022-01-05 | Stop reason: HOSPADM

## 2022-01-05 RX ORDER — SODIUM CHLORIDE 9 MG/ML
INJECTION, SOLUTION INTRAVENOUS CONTINUOUS
Status: DISCONTINUED | OUTPATIENT
Start: 2022-01-05 | End: 2022-01-05 | Stop reason: HOSPADM

## 2022-01-05 RX ADMIN — SODIUM CHLORIDE: 9 INJECTION, SOLUTION INTRAVENOUS at 11:09

## 2022-01-05 RX ADMIN — SODIUM CHLORIDE: 9 INJECTION, SOLUTION INTRAVENOUS at 09:54

## 2022-01-05 RX ADMIN — PROPOFOL 340 MG: 10 INJECTION, EMULSION INTRAVENOUS at 11:25

## 2022-01-05 RX ADMIN — SODIUM CHLORIDE: 9 INJECTION, SOLUTION INTRAVENOUS at 10:48

## 2022-01-05 ASSESSMENT — PAIN SCALES - GENERAL
PAINLEVEL_OUTOF10: 0

## 2022-01-05 ASSESSMENT — PAIN - FUNCTIONAL ASSESSMENT: PAIN_FUNCTIONAL_ASSESSMENT: 0-10

## 2022-01-05 ASSESSMENT — LIFESTYLE VARIABLES: SMOKING_STATUS: 0

## 2022-01-05 NOTE — PROGRESS NOTES
Patient admitted to [facility], placed on all appropriate monitors, all siderails up, cart locked,bed in low position.

## 2022-01-05 NOTE — ANESTHESIA POSTPROCEDURE EVALUATION
Department of Anesthesiology  Postprocedure Note    Patient: Winton Call  MRN: [id number]  YOB: 1960  Date of evaluation: 1/5/2022  Time:  11:52 AM     Procedure Summary     Date: 01/05/22 Room / Location: [address] [place] [address] / [facility]    Anesthesia Start: 2935 Anesthesia Stop: 6588    Procedure: COLORECTAL CANCER SCREENING, NOT HIGH RISK (N/A ) Diagnosis: (SCREENING)    Surgeons: Blair Murray MD Responsible Provider: Amrita Navarro MD    Anesthesia Type: MAC ASA Status: 2          Anesthesia Type: MAC    Perla Phase I: Perla Score: 10    Perla Phase II:      Last vitals: Reviewed and per EMR flowsheets.        Anesthesia Post Evaluation    Patient location during evaluation: PACU  Patient participation: complete - patient participated  Level of consciousness: awake  Airway patency: patent  Nausea & Vomiting: no nausea and no vomiting  Complications: no  Cardiovascular status: hemodynamically stable  Respiratory status: acceptable  Hydration status: euvolemic

## 2022-01-05 NOTE — OP NOTE
[address] [place] [address]  COLONOSCOPY REPORT    DATE OF PROCEDURE: 1/5/2022    SURGEON: Romelia May MD, FACS    ASSISTANT: None    INDICATION:  Average risk for colorectal cancer    PROCEDURE:  Colonoscopy     ANESTHESIA: MAC     EBL: None    COMPLICATIONS: none    SPECIMENS: none obtained    PROCEDURE: After identification of the patient and confirmation of the procedure, the patient was brought to the endoscopy suite and placed in the left lateral decubitus position. The patient was administered deep conscious sedation by the anesthesia staff. A digital rectal exam was performed, which was unremarkable. A lubricated adult colonoscope was inserted through the anus into the rectum and advanced into the cecum. The bowel prep was excellent. The terminal ileum was not intubated. The scope was then slowly withdrawn all the way back into the rectum and retroflexed. No biopsies were obtained. The scope was straightened. The colon was decompressed. The scope was removed.       FINDINGS:  Normal exam    ASSESSMENT:  Average risk for colorectal cancer    RECOMMENDATIONS:   Repeat colonoscopy in 10 years, earlier if alarm symptoms (pain, bleeding, weight loss, diarrhea, or sudden onset constipation occur)      Tomy Moulton MD, FACS

## 2022-01-05 NOTE — H&P
GENERAL SURGERY  HISTORY & PHYSICAL    Dereje Brown  62 y.o. female   Liliana Clemente MD     CC:  Colorectal cancer screening    HPI  The patient presents for screening colonoscopy today. Past Medical History:   Diagnosis Date    Hypertension        Past Surgical History:   Procedure Laterality Date     SECTION      COLONOSCOPY  2012    Vanita Ayala - 5 yrs    DILATION AND CURETTAGE      DILATION AND CURETTAGE OF UTERUS  2021    [facility], polypectomy    TUBAL LIGATION Bilateral     ESSURE       Social History     Socioeconomic History    Marital status:      Spouse name: Not on file    Number of children: Not on file    Years of education: Not on file    Highest education level: Not on file   Occupational History    Not on file   Tobacco Use    Smoking status: Never Smoker    Smokeless tobacco: Never Used   Vaping Use    Vaping Use: Never used   Substance and Sexual Activity    Alcohol use: Yes     Alcohol/week: 7.0 standard drinks     Types: 7 Glasses of wine per week     Comment: daily glass of wine    Drug use: No    Sexual activity: Yes     Partners: Male     Birth control/protection: Post-menopausal   Other Topics Concern    Not on file   Social History Narrative    Not on file     Social Determinants of Health     Financial Resource Strain:     Difficulty of Paying Living Expenses: Not on file   Food Insecurity:     Worried About Running Out of Food in the Last Year: Not on file    Arpita of Food in the Last Year: Not on file   Transportation Needs:     Lack of Transportation (Medical): Not on file    Lack of Transportation (Non-Medical):  Not on file   Physical Activity:     Days of Exercise per Week: Not on file    Minutes of Exercise per Session: Not on file   Stress:     Feeling of Stress : Not on file   Social Connections:     Frequency of Communication with Friends and Family: Not on file    Frequency of Social Gatherings with Friends and Family: Not on file    Attends Synagogue Services: Not on file    Active Member of Clubs or Organizations: Not on file    Attends Club or Organization Meetings: Not on file    Marital Status: Not on file   Intimate Partner Violence:     Fear of Current or Ex-Partner: Not on file    Emotionally Abused: Not on file    Physically Abused: Not on file    Sexually Abused: Not on file   Housing Stability:     Unable to Pay for Housing in the Last Year: Not on file    Number of [place] in the Last Year: Not on file    Unstable Housing in the Last Year: Not on file        Family History   Problem Relation Age of Onset    Stroke Maternal Grandmother     Heart Disease Father     Heart Attack Mother     Stroke Mother     Hypertension Mother     Diabetes Brother         No current facility-administered medications on file prior to encounter. Current Outpatient Medications on File Prior to Encounter   Medication Sig Dispense Refill    progesterone micronized powder Progesterone SR 150mg one tab po q hs (Disp: 3mo supply) 1 bottle=1 month 3 each 1    estradiol (VIVELLE) 0.05 MG/24HR APPLY 1 PATCH ONTO THE SKIN TWICE A WEEK. CHANGES PATCH ON MONDAYS AND THURSDAYS.  24 patch 3    Turmeric (QC TUMERIC COMPLEX) 500 MG CAPS Take by mouth      Milk Thistle 1000 MG CAPS Take by mouth      Bacillus Coagulans-Inulin (PROBIOTIC) 1-250 BILLION-MG CAPS Take by mouth      amLODIPine (NORVASC) 5 MG tablet TAKE 1 TABLET DAILY 90 tablet 0    hydrochlorothiazide (HYDRODIURIL) 25 MG tablet TAKE 1 TABLET DAILY 90 tablet 2    calcium carbonate (OSCAL) 500 MG TABS tablet Take 500 mg by mouth daily      PREVIDENT 5000 SENSITIVE 1.1-5 % PSTE       Multiple Vitamins-Minerals (THERAPEUTIC MULTIVITAMIN-MINERALS) tablet Take 1 tablet by mouth daily         No Known Allergies     Review of Systems  General - no chills, fever, weight gain or weight loss  Respiratory - no cough, shortness of breath, or wheezing  Cardiovascular - no chest pain or dyspnea on exertion  Gastrointestinal - no abdominal pain, blood in stools, change in bowel habits, constipation or diarrhea  Neurological - no headaches, numbness/tingling or weakness    Physical Exam   BP (!) 148/60   Pulse 62   Temp 97 °F (36.1 °C) (Temporal)   Resp 20   Ht 4' 11\" (1.499 m)   Wt 115 lb (52.2 kg)   LMP  (LMP Unknown)   SpO2 97%   BMI 23.23 kg/m²   General - no acute distress, comfortable   Respiratory - normal effort, clear to auscultation  CV - regular rate and rhythm, strong femoral pulses, no pedal edema  GI - non distended,  non tender, no hernia, no mass, no hepatosplenomegaly     Assessment    Colorectal cancer screening    Plan  Screening colonoscopy       Brenda Ibrahim MD, FACS

## 2022-01-05 NOTE — ANESTHESIA PRE PROCEDURE
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       Past Medical History:        Diagnosis Date    Hypertension        Past Surgical History:        Procedure Laterality Date     SECTION      COLONOSCOPY  2012    Flaco Pulido - 5 yrs    DILATION AND CURETTAGE      DILATION AND CURETTAGE OF UTERUS  2021    [facility], polypectomy    TUBAL LIGATION Bilateral     ESSURE       Social History:    Social History     Tobacco Use    Smoking status: Never Smoker    Smokeless tobacco: Never Used   Substance Use Topics    Alcohol use: Yes     Alcohol/week: 7.0 standard drinks     Types: 7 Glasses of wine per week     Comment: daily glass of wine                                Counseling given: Not Answered      Vital Signs (Current):   Vitals:    21 0903 22 0930   BP:  (!) 148/60   Pulse:  62   Resp:  20   Temp:  36.1 °C (97 °F)   TempSrc:  Temporal   SpO2:  97%   Weight: 115 lb (52.2 kg) 115 lb (52.2 kg)   Height: 4' 11\" (1.499 m) 4' 11\" (1.499 m)                                              BP Readings from Last 3 Encounters:   22 (!) 148/60   21 112/82   21 110/82       NPO Status: Time of last liquid consumption:                         Time of last solid consumption:                         Date of last liquid consumption: 22                        Date of last solid food consumption: 22    BMI:   Wt Readings from Last 3 Encounters:   22 115 lb (52.2 kg)   21 113 lb (51.3 kg)   21 113 lb (51.3 kg)     Body mass index is 23.23 kg/m².     CBC:   Lab Results   Component Value Date WBC 6.2 2020    RBC 4.79 2020    HGB 15.2 2020    HCT 45.3 2020    MCV 94.6 2020    RDW 12.4 2020     2020       CMP:   Lab Results   Component Value Date     2019    K 4.1 2019     2019    CO2 32 2019    BUN 9 2019    CREATININE 0.6 2019    CREATININE 0.7 2019    GFRAA >60 2019    LABGLOM >60 2019    GLUCOSE 101 2019    PROT 7.1 2019    CALCIUM 9.6 2019    BILITOT 0.5 2019    ALKPHOS 54 2019    AST 18 2019    ALT 16 2019       POC Tests: No results for input(s): POCGLU, POCNA, POCK, POCCL, POCBUN, POCHEMO, POCHCT in the last 72 hours. Coags: No results found for: PROTIME, INR, APTT    HCG (If Applicable):   Lab Results   Component Value Date    PREGTESTUR negative 01/15/2016        ABGs: No results found for: PHART, PO2ART, VTL3TBD, GHJ7KGX, BEART, P9RVQWKV     Type & Screen (If Applicable):  No results found for: LABABO, LABRH    Drug/Infectious Status (If Applicable):  No results found for: HIV, HEPCAB    COVID-19 Screening (If Applicable): No results found for: COVID19    EK2019  Sinus bradycardia  Left axis deviation  Low voltage QRS  Abnormal ECG  No previous ECGs available  Confirmed by Dena Dee ([id number]) on 2019 5:18:14 PM        Anesthesia Evaluation  Patient summary reviewed and Nursing notes reviewed no history of anesthetic complications:   Airway: Mallampati: II  TM distance: >3 FB   Neck ROM: full  Mouth opening: > = 3 FB Dental: normal exam     Comment: Patient denies any chipped, loose, or missing teeth     Pulmonary:Negative Pulmonary ROS breath sounds clear to auscultation      (-) not a current smoker          Patient did not smoke on day of surgery.                  Cardiovascular:  Exercise tolerance: good (>4 METS),   (+) hypertension:,       ECG reviewed  Rhythm: regular  Rate: normal           Beta Blocker:  Not on Beta Blocker         Neuro/Psych:   Negative Neuro/Psych ROS               ROS comment: White matter abnormality on MRI of brain    Vertigo related to root canal. No issues since  GI/Hepatic/Renal:   (+) bowel prep,           Endo/Other:                      ROS comment: Vitamin D deficiency Abdominal:             Vascular: negative vascular ROS. Other Findings:           Anesthesia Plan      MAC     ASA 2     (Right AC )  Induction: intravenous. MIPS: Postoperative opioids intended and Prophylactic antiemetics administered. Anesthetic plan and risks discussed with patient. Use of blood products discussed with patient whom consented to blood products. Plan discussed with attending. Chart reviewed & patient seen. I agree with the above note.   Linnea Ely, APRN - CRNA          Pillo Santos RN   1/5/2022

## 2022-02-04 PROBLEM — Z12.11 COLON CANCER SCREENING: Status: RESOLVED | Noted: 2022-01-05 | Resolved: 2022-02-04

## 2024-06-13 ENCOUNTER — HOSPITAL ENCOUNTER (OUTPATIENT)
Age: 64
Discharge: HOME | End: 2024-06-15

## 2024-06-19 LAB — SURGICAL PATHOLOGY REPORT: NORMAL

## (undated) DEVICE — CANNULA NSL ORAL AD FOR CAPNOFLEX CO2 O2 AIRLFE

## (undated) DEVICE — DEFENDO AIR WATER SUCTION AND BIOPSY VALVE KIT FOR  OLYMPUS: Brand: DEFENDO AIR/WATER/SUCTION AND BIOPSY VALVE

## (undated) DEVICE — GAUZE,SPONGE,POST-OP,4X3,STRL,LF: Brand: MEDLINE

## (undated) DEVICE — CONNECTOR IRRIGATION AUXILIARY H2O JET W/ PRT MTL THRD HYDR